# Patient Record
Sex: FEMALE | Race: BLACK OR AFRICAN AMERICAN | NOT HISPANIC OR LATINO | Employment: UNEMPLOYED | URBAN - METROPOLITAN AREA
[De-identification: names, ages, dates, MRNs, and addresses within clinical notes are randomized per-mention and may not be internally consistent; named-entity substitution may affect disease eponyms.]

---

## 2021-11-17 ENCOUNTER — HISTORICAL (OUTPATIENT)
Dept: ADMINISTRATIVE | Facility: HOSPITAL | Age: 56
End: 2021-11-17

## 2021-11-19 LAB — FINAL CULTURE: NO GROWTH

## 2021-12-07 ENCOUNTER — HISTORICAL (OUTPATIENT)
Dept: ADMINISTRATIVE | Facility: HOSPITAL | Age: 56
End: 2021-12-07

## 2021-12-09 LAB — FINAL CULTURE: NORMAL

## 2022-07-18 DIAGNOSIS — E04.1 THYROID NODULE: Primary | ICD-10-CM

## 2022-07-25 ENCOUNTER — HISTORICAL (OUTPATIENT)
Dept: ADMINISTRATIVE | Facility: HOSPITAL | Age: 57
End: 2022-07-25

## 2023-06-07 ENCOUNTER — HOSPITAL ENCOUNTER (OUTPATIENT)
Dept: RADIOLOGY | Facility: HOSPITAL | Age: 58
Discharge: HOME | End: 2023-06-07
Attending: OTOLARYNGOLOGY
Payer: MEDICAID

## 2023-06-07 DIAGNOSIS — E04.1 NONTOXIC UNINODULAR GOITER: ICD-10-CM

## 2023-06-07 PROCEDURE — 76536 US EXAM OF HEAD AND NECK: CPT | Mod: TC

## 2023-07-19 DIAGNOSIS — E04.2 MULTIPLE THYROID NODULES: Primary | ICD-10-CM

## 2023-08-21 ENCOUNTER — OFFICE VISIT (OUTPATIENT)
Dept: OTOLARYNGOLOGY | Facility: CLINIC | Age: 58
End: 2023-08-21
Payer: MEDICAID

## 2023-08-21 VITALS
HEIGHT: 65 IN | TEMPERATURE: 98 F | DIASTOLIC BLOOD PRESSURE: 86 MMHG | BODY MASS INDEX: 38.02 KG/M2 | WEIGHT: 228.19 LBS | SYSTOLIC BLOOD PRESSURE: 152 MMHG | HEART RATE: 66 BPM

## 2023-08-21 DIAGNOSIS — K21.9 GASTROESOPHAGEAL REFLUX DISEASE, UNSPECIFIED WHETHER ESOPHAGITIS PRESENT: ICD-10-CM

## 2023-08-21 DIAGNOSIS — R13.10 DYSPHAGIA, UNSPECIFIED TYPE: ICD-10-CM

## 2023-08-21 DIAGNOSIS — E04.2 MULTIPLE THYROID NODULES: Primary | ICD-10-CM

## 2023-08-21 DIAGNOSIS — R49.0 DYSPHONIA: ICD-10-CM

## 2023-08-21 DIAGNOSIS — J30.9 ALLERGIC RHINITIS, UNSPECIFIED SEASONALITY, UNSPECIFIED TRIGGER: ICD-10-CM

## 2023-08-21 DIAGNOSIS — T17.308A CHOKING, INITIAL ENCOUNTER: ICD-10-CM

## 2023-08-21 DIAGNOSIS — G47.30 SLEEP DISORDER BREATHING: ICD-10-CM

## 2023-08-21 PROCEDURE — 31575 PR LARYNGOSCOPY, FLEXIBLE; DIAGNOSTIC: ICD-10-PCS | Mod: S$PBB,,, | Performed by: NURSE PRACTITIONER

## 2023-08-21 PROCEDURE — 3008F BODY MASS INDEX DOCD: CPT | Mod: CPTII,,, | Performed by: NURSE PRACTITIONER

## 2023-08-21 PROCEDURE — 3077F SYST BP >= 140 MM HG: CPT | Mod: CPTII,,, | Performed by: NURSE PRACTITIONER

## 2023-08-21 PROCEDURE — 3079F PR MOST RECENT DIASTOLIC BLOOD PRESSURE 80-89 MM HG: ICD-10-PCS | Mod: CPTII,,, | Performed by: NURSE PRACTITIONER

## 2023-08-21 PROCEDURE — 3008F PR BODY MASS INDEX (BMI) DOCUMENTED: ICD-10-PCS | Mod: CPTII,,, | Performed by: NURSE PRACTITIONER

## 2023-08-21 PROCEDURE — 3077F PR MOST RECENT SYSTOLIC BLOOD PRESSURE >= 140 MM HG: ICD-10-PCS | Mod: CPTII,,, | Performed by: NURSE PRACTITIONER

## 2023-08-21 PROCEDURE — 31575 DIAGNOSTIC LARYNGOSCOPY: CPT | Mod: S$PBB,,, | Performed by: NURSE PRACTITIONER

## 2023-08-21 PROCEDURE — 99215 OFFICE O/P EST HI 40 MIN: CPT | Mod: PBBFAC | Performed by: NURSE PRACTITIONER

## 2023-08-21 PROCEDURE — 3079F DIAST BP 80-89 MM HG: CPT | Mod: CPTII,,, | Performed by: NURSE PRACTITIONER

## 2023-08-21 PROCEDURE — 1159F MED LIST DOCD IN RCRD: CPT | Mod: CPTII,,, | Performed by: NURSE PRACTITIONER

## 2023-08-21 PROCEDURE — 1159F PR MEDICATION LIST DOCUMENTED IN MEDICAL RECORD: ICD-10-PCS | Mod: CPTII,,, | Performed by: NURSE PRACTITIONER

## 2023-08-21 PROCEDURE — 99205 PR OFFICE/OUTPT VISIT, NEW, LEVL V, 60-74 MIN: ICD-10-PCS | Mod: 25,S$PBB,, | Performed by: NURSE PRACTITIONER

## 2023-08-21 PROCEDURE — 99205 OFFICE O/P NEW HI 60 MIN: CPT | Mod: 25,S$PBB,, | Performed by: NURSE PRACTITIONER

## 2023-08-21 PROCEDURE — 31575 DIAGNOSTIC LARYNGOSCOPY: CPT | Mod: PBBFAC | Performed by: NURSE PRACTITIONER

## 2023-08-21 RX ORDER — CYCLOBENZAPRINE HCL 10 MG
TABLET ORAL
COMMUNITY

## 2023-08-21 RX ORDER — LIDOCAINE HYDROCHLORIDE 40 MG/ML
1 INJECTION, SOLUTION RETROBULBAR ONCE
Status: DISPENSED | OUTPATIENT
Start: 2023-08-21

## 2023-08-21 RX ORDER — PANTOPRAZOLE SODIUM 40 MG/1
40 TABLET, DELAYED RELEASE ORAL DAILY
Qty: 30 TABLET | Refills: 2 | Status: SHIPPED | OUTPATIENT
Start: 2023-08-21 | End: 2023-08-21 | Stop reason: SDUPTHER

## 2023-08-21 RX ORDER — ASPIRIN 325 MG
TABLET, DELAYED RELEASE (ENTERIC COATED) ORAL
COMMUNITY

## 2023-08-21 RX ORDER — PANTOPRAZOLE SODIUM 40 MG/1
40 TABLET, DELAYED RELEASE ORAL 2 TIMES DAILY
Qty: 30 TABLET | Refills: 1 | Status: SHIPPED | OUTPATIENT
Start: 2023-08-21 | End: 2023-11-21

## 2023-08-21 RX ORDER — IBUPROFEN 800 MG/1
TABLET ORAL
COMMUNITY

## 2023-08-21 RX ORDER — DICLOFENAC SODIUM 75 MG/1
TABLET, DELAYED RELEASE ORAL
COMMUNITY

## 2023-08-21 RX ORDER — NAPROXEN 500 MG/1
500 TABLET ORAL 2 TIMES DAILY
COMMUNITY
Start: 2023-08-01

## 2023-08-21 RX ORDER — HYDROCODONE BITARTRATE AND ACETAMINOPHEN 10; 325 MG/1; MG/1
TABLET ORAL
COMMUNITY

## 2023-08-21 RX ORDER — FLUTICASONE PROPIONATE 50 MCG
1 SPRAY, SUSPENSION (ML) NASAL DAILY
Qty: 16 G | Refills: 11 | Status: SHIPPED | OUTPATIENT
Start: 2023-08-21

## 2023-08-21 RX ORDER — METAXALONE 800 MG/1
800 TABLET ORAL 3 TIMES DAILY
COMMUNITY
Start: 2023-08-17

## 2023-08-21 RX ORDER — ROSUVASTATIN CALCIUM 5 MG/1
5 TABLET, COATED ORAL
COMMUNITY
Start: 2023-08-07

## 2023-08-21 RX ORDER — MECLIZINE HCL 12.5 MG 12.5 MG/1
TABLET ORAL
COMMUNITY

## 2023-08-21 NOTE — PROGRESS NOTES
"[facility]  Otolaryngology Clinic Note    Pattie Stewart  YOB: 1965    Chief Complaint: thyroid    HPI: 2023: 57yoF referred for thyroid nodule. States she had it drained by Dr. Upton about 2 years ago and has had 2 benign biopsies in the past 2-3 years. Endorses frequent dysphagia and choking as well as dysphonia which began about 2 years ago. Denies compressive SOB but states she does not lie supine because of prior back surgery. States PCP lan blood work recently and told her thyroid labs were normal. Endorses frequent reflux/indigestion which is untreated. She does not have family hx of thyroid cancer, but does have one sister who had thyroidectomy and another sister who took CATALAN for overactive thyroid. Denies radiation exposure. C/o "sinus pressure" which flares with seasonal changes and improves with OTC meds. She has not used any rinses or sprays. States she usually only sleeps a couple hours a night. She is unsure if she snores. She has 2 siblings with NORI.    ROS:   10-point review of systems negative except per HPI      Review of patient's allergies indicates:  No Known Allergies    Past Medical History:   Diagnosis Date    Hypertension     Hypothyroidism     Migraine headache     Obesity     Rheumatoid arthritis     Seasonal allergies     Sleep apnea        Past Surgical History:   Procedure Laterality Date    BLADDER SURGERY       SECTION  1991    HYSTERECTOMY      SPINE SURGERY         Social History     Socioeconomic History    Marital status:    Tobacco Use    Smoking status: Never     Passive exposure: Never    Smokeless tobacco: Never   Substance and Sexual Activity    Alcohol use: Yes     Comment: Social occasions    Drug use: Never    Sexual activity: Not Currently     Partners: Male     Birth control/protection: See Surgical Hx       Family History   Problem Relation Age of Onset    Cancer Father     Thyroid disease Sister  " "      Outpatient Encounter Medications as of 8/21/2023   Medication Sig Dispense Refill    cholecalciferol, vitamin D3, 1,250 mcg (50,000 unit) capsule cholecalciferol (vitamin D3) 1,250 mcg (50,000 unit) capsule   TAKE 1 CAPSULE BY MOUTH ONCE A WEEK FOR 12 WEEKS      cyclobenzaprine (FLEXERIL) 10 MG tablet cyclobenzaprine 10 mg tablet   TAKE 1 TABLET BY MOUTH THREE TIMES DAILY AS NEEDED      diclofenac (VOLTAREN) 75 MG EC tablet diclofenac sodium 75 mg tablet,delayed release   TAKE 1 TABLET BY MOUTH TWICE DAILY      HYDROcodone-acetaminophen (NORCO)  mg per tablet as needed.      ibuprofen (ADVIL,MOTRIN) 800 MG tablet as needed.      meclizine (ANTIVERT) 12.5 mg tablet meclizine 12.5 mg tablet   TAKE 1 TABLET BY MOUTH THREE TIMES DAILY AS NEEDED      metaxalone (SKELAXIN) 800 MG tablet Take 800 mg by mouth 3 (three) times daily.      naproxen (NAPROSYN) 500 MG tablet Take 500 mg by mouth 2 (two) times daily.      rosuvastatin (CRESTOR) 5 MG tablet Take 5 mg by mouth.       No facility-administered encounter medications on file as of 8/21/2023.       Physical Exam:  Vitals:    08/21/23 0941 08/21/23 0943   BP: (!) 151/89 (!) 152/86   BP Location: Right arm Right arm   Patient Position: Sitting Sitting   BP Method: Large (Automatic) Large (Manual)   Pulse: 66    Temp: 98.4 °F (36.9 °C)    TempSrc: Oral    Weight: 103.5 kg (228 lb 3.2 oz)    Height: 5' 5" (1.651 m)        General: NAD, voice raspy  Neuro: AAO, CN II - XII grossly intact  Head/ Face: NCAT, symmetric, sensations intact bilaterally  Eyes: EOMI, PERRL  Ears: externally normal with grossly normal hearing  AD: EAC patent, TM intact, no middle ear effusion, no retractions  AS: EAC patent, TM intact, no middle ear effusion, no retractions  Nose: bilateral nares patent, midline septum, no rhinorrhea, no external deformity, no turbinate hypertrophy  OC/OP: MMM, no intraoral lesions, no trismus, dentition is moderate, no uvular deviation, bilaterally " symmetric soft palate elevation, palatoglossus and palatopharyngeal fold wnl; tonsils are symmetric and 1+. Mallampati IV  Indirect laryngoscopy: deferred due to patient intolerance  Neck: soft, supple, no LAD, normal ROM, no palpable thyromegaly or nodularity  Respiratory: nonlabored, no wheezing, bilateral chest rise  Cardiovascular: RRR  Gastrointestinal: S NT ND  Skin: warm, no lesions  Musculoskeletal: 5/5 strength  Psych: Appropriate affect/mood     Flexible Fiberoptic Laryngoscopy/Nasopharyngoscopy with Dr. Don    Procedure in Detail: Informed consent was obtained from the patient after explanation of procedure, indications, risks and benefits. Flexible endoscopy was performed through the nasal passages. The nasal cavity, nasopharynx, oropharynx, hypopharynx and larynx were adequately visualized. The true vocal cords and arytenoids were examined during phonation and repose.    Anesthesia: Topical Neosynephrine / Lidocaine  Adverse Events: None  Blood loss: none  Condition: good    Findings:  NP/OP: no masses/lesions of NC, eustachian tube, fossa of Rosenmuller, no adenoid hypertrophy  BOT/vallecula: no lingual hypertrophy, no masses/lesions, no secretions obscuring visualization  Piriform sinuses/post-cricoid: no masses/lesions, no pooling of secretions  Epiglottis: lingual and laryngeal surfaces within normal limits  Arytenoids/FVFs: no masses/lesions, no edema, bilateral mobility. Mild interarytenoid erythema  TVCs: bilateral cord mobility, no masses or lesions.   No aspiration, no pooled secretions  No sign of malignancy      Pertinent Data:  ? LABS:  ? AUDIO:           ? PATH:      Imaging:           Assessment/Plan:  57 y.o. female with 3.9cm right thyroid nodule, reports FNA x2 with benign path and normal TFT's. Also with untreated GERD, AR, dysphagia. She is interested in discussing surgical thyroid interventions, however, will tx GERD & eval swallow first. D/w Dr. Don. Will request path for  previous FNA.   - NSI prn  - Flonase daily  - Protonix BID  - GERD lifestyle modifications  - MBS + esophagram  - PSG  - RTC 2-3mo Res template or Res day    Nallely Hairston NP

## 2023-08-21 NOTE — PROGRESS NOTES
The scope used for the exam was:  Flexible scope ENF-P4  Serial Number:  1)    [id number]    []   2)    [id number]    []   3)    [id number]    []   4)    [id number]    [x]   5)    [id number]    []   6)    [id number]    []       The scope used for the exam was:  Rigid scope   Serial Number:  1)   [id number]    []   2)   6282    []   3)   7330    []   4)    3384   []   5)    0824   []   6)    5554   []     7)   7425    []   8)   2240    []   9)   1109    []

## 2023-08-28 ENCOUNTER — HOSPITAL ENCOUNTER (OUTPATIENT)
Dept: RADIOLOGY | Facility: HOSPITAL | Age: 58
Discharge: HOME | End: 2023-08-28
Attending: NURSE PRACTITIONER
Payer: MEDICAID

## 2023-08-28 DIAGNOSIS — T17.308A CHOKING, INITIAL ENCOUNTER: ICD-10-CM

## 2023-08-28 DIAGNOSIS — R13.10 DYSPHAGIA, UNSPECIFIED TYPE: ICD-10-CM

## 2023-08-28 DIAGNOSIS — K21.9 GASTROESOPHAGEAL REFLUX DISEASE, UNSPECIFIED WHETHER ESOPHAGITIS PRESENT: ICD-10-CM

## 2023-08-28 PROCEDURE — 74220 X-RAY XM ESOPHAGUS 1CNTRST: CPT | Mod: TC

## 2023-11-08 ENCOUNTER — CLINICAL SUPPORT (OUTPATIENT)
Dept: REHABILITATION | Facility: HOSPITAL | Age: 58
End: 2023-11-08
Attending: NURSE PRACTITIONER
Payer: MEDICAID

## 2023-11-08 ENCOUNTER — HOSPITAL ENCOUNTER (OUTPATIENT)
Dept: RADIOLOGY | Facility: HOSPITAL | Age: 58
Discharge: HOME | End: 2023-11-08
Attending: NURSE PRACTITIONER
Payer: MEDICAID

## 2023-11-08 DIAGNOSIS — R13.10 DYSPHAGIA, UNSPECIFIED TYPE: ICD-10-CM

## 2023-11-08 DIAGNOSIS — T17.308A CHOKING, INITIAL ENCOUNTER: ICD-10-CM

## 2023-11-08 DIAGNOSIS — K21.9 GASTROESOPHAGEAL REFLUX DISEASE, UNSPECIFIED WHETHER ESOPHAGITIS PRESENT: ICD-10-CM

## 2023-11-08 PROCEDURE — 74230 X-RAY XM SWLNG FUNCJ C+: CPT | Mod: TC

## 2023-11-08 PROCEDURE — 92611 MOTION FLUOROSCOPY/SWALLOW: CPT

## 2023-11-09 NOTE — PLAN OF CARE
[facility]  MODIFIED BARIUM SWALLOW STUDY  Outpatient    Date: 23      Name: Pattie Stewart   MRN: [id number]    Therapy Diagnosis: swallow WFL    Physician: Nallely Hairston FNP  Physician Orders: SLP Video Swallow  Medical Diagnosis from Referral:       Time In:  0800  Time Out:  0830  Total Billable Time: 30     Procedure Min.   Fl Modified Barium Swallow Speech  30     Precautions: Standard and Aspiration    Recommendations:   Consistency Recommendations:  Thin liquids (IDDSI 0) and Regular consistencies (IDDSI 7).  Medications should be taken Whole in thin liquids.   Precautions:frequent oral care  Risk Management: use good oral hygiene , sit upright for all PO intake, and increase physical mobility as tolerated  Specialist Referrals:   Ancillary Tests:   Therapy: Dysphagia therapy is not recommended at this time.  Frequency:   Follow-up exam: Follow up swallow study is not indicated at this time.    Subjective       Past Medical History: Pattie Stewart  has a past surgical history that includes  section (1991); Spine surgery; Hysterectomy; and Bladder surgery.     Active Ambulatory Problems     Diagnosis Date Noted    No Active Ambulatory Problems     Resolved Ambulatory Problems     Diagnosis Date Noted    No Resolved Ambulatory Problems     Past Medical History:   Diagnosis Date    Hypertension     Hypothyroidism     Migraine headache     Obesity     Rheumatoid arthritis     Seasonal allergies     Sleep apnea       Medical Hx and Allergies:  Review of patient's allergies indicates:  No Known Allergies    Modified barium swallow study (MBSS) completed to assess swallow effectiveness, ID/rule out penetration and aspiration, make appropriate recommendations regarding safest diet consistency, effective compensatory strategies and safe eating environment.       The patient gave the following history:   -Current diet at home: regular/thin      The following  observations were made:   -Mental status: Alert and Cooperative  -Factors affecting performance: no difficulties participating in the study  -Feeding Method: independent in self-feeding    Respiratory Status:   -Respiratory Status: room air      Pain Scale:  0/10 on VAS currently.   Pain Location: no pain reported    Objective   Cranial Nerve Examination  Cranial Nerve 5: Trigeminal Nerve  Motor Jaw Posture at rest: Closed  Mandible Elevation/Depression: WFL  Mandible lateralization: WFL  Abnormal movement: absent Interpretation:   intact   Sensory Forehead: WFL  Cheek: WFL  Jaw: WFL  Facial Pain: None noted Interpretation:   intact     Cranial Nerve 7: Facial Nerve  Motor Facial Symmetry: WNL  Wrinkle Forehead: WFL  Close eyes tightly: WFL  Labial Protrusion: WFL  Labial Retraction: WFL  Abnormal movement: absent Interpretation:   intact   Sensory Formal testing not completed. Patient denied any changes in taste      Cranial Nerves IX and X: Glossopharyngeal and Vagus Nerves  Motor Palatal Symmetry (Rest): WNL  Palatal Symmetry (Movement): WNL  Cough: Perceptually strong  Voice Prior to PO intake: Clear  Resonance: Normal  Abnormal movement: absent Interpretation:   intact     Cranial Nerve XII: Hypoglossal Nerve  Motor Tongue at rest: WNL  Lingual Protrusion: WNL  Lingual Protrusion against Resistance: WNL  Lingual Lateralization: WNL  Abnormal movement: absent Interpretation:   intact     Other information:  Volitional Swallow: Able to palpate laryngeal rise  Mucosal Quality: No abnormal findings  Secretion Management: Secretion Mgmt: adequate  Dentition: Good condition for speech and mastication     CONSISTENCIES ADMINISTERED:  Thin Liquids (IDDSI 0):  Mode: Independent   Oral Residue: none    Vallecular Residue: none    Pyriform Sinus Residue: none    Rosenbeck's 8-Point Penetration-Aspiration Scale:  Best: (1) Material does not enter the airway  penetration or aspiration did not occur during the swallow with  thin liquids via straw  Strategies attempted:   Barium Tablet: The barium tablet was administered in thin liquids due to  .    Mildly Thick Liquids (IDDSI 2):  Puree (IDDSI 4):  Mode: Independent  Oral Residue: none   Vallecular Residue: none   Pyriform Sinus Residue none  Rosenbeck's 8-Point Penetration-Aspiration Scale:   Best: none  penetration or aspiration did not occur during the swallow.    Strategies attempted:   Barium Tablet:     Mixed (Soft and Bite Sized) Consistency Bolus (IDDSI 6 with IDDSI 2):  DNT    Regular (IDDSI 7):  Mode: Independent  Oral Residue: none   Vallecular Residue: none   Pyriform Sinus Residue: none   Rosenbeck's 8-Point Penetration-Aspiration Scale:   Best: (1) Material does not enter the airway  penetration or aspiration did not occur during the swallow.    Strategies attempted:     Impression   Patient's oropharyngeal swallow appear within functional limits for the  consistencies assessed. No clinical s/sx of airway invasion appreciated during this assessment. Based on this assessment, patient appears safe for initiation of a PO diet with general swallowing precautions. SLP to follow up as indicated.     Assessment     Preparatory Phase:     Patient Positioning:   Upright in radiology chair      Level of Assistance:   Independent      Regulation of Bolus:   Independent    Oral Phase:     Labial Seal:   no labial escape      Lingual Movement:   Lingual motion was brisk for adequate bolus transport.    Bolus Acceptance:   WFL     Oral Manipulation:   WFL     Mastication:   timely and efficient    Oral Transit:   WFL     Oral Pocketing:   No    Oral Residue:   There was no significant oral residue.    Pharyngeal Phase:     Tongue Base Retraction:   No contrast between the base of tongue and posterior pharyngeal wall with any consistency      Initiation of Pharyngeal Swallow:   Occurs when the bolus head is at the ramus of the mandible with most consistency      Epiglottic  Closure:  Complete inversion      Laryngeal Elevation:   Complete elevation      Anterior Hyoid Excursion:   Complete excursion      Pharyngeal Contractions (A-P view only):   DNT     Pharyngeal Stripping Wave:   Present       Vallecular Residue:  None observed with any consistencies      Posterior Pharyngeal Wall Residue:   None observed with any consistencies      Pyriform Sinus Residue:   None observed with any consistencies       Esophageal Phase:   On esophageal screen, UES appeared to accommodate all bolus types without stasis or retrograde movement observed          Goals:           The Functional Oral Intake Scale (FOIS) is an ordinal scale that is used to assess the current status and meaningful change in the oral intake. FOIS levels include:        TUBE DEPENDENT   (Levels 1-3) 1. No oral intake    2. Tube dependent with minimal/inconsistent oral intake    3. Tube supplements with consistent oral intake          TOTAL ORAL INTAKE (Levels 4-7) 4. Total oral intake of a single consistency    5. Total oral intake of multiple consistencies requiring special preparation    6. Total oral intake with no special preparation, but must avoid specific foods or liquid items    7. Total oral intake with no restrictions   Patient is currently judged to be at FOIS Level 7      *MARVIN De La Rosa (2005, March). Pneumonia: Factors Beyond Aspiration. Perspectives in Swallowing and Swallowing Disorders (Dysphagia), 14, 10-16.  Education   Education: Results of the study and Recommendations were discussed with the patient. Patient expressed understanding.     Barriers to Learning: n/a    Teaching Method: Verbal, Audio/Visual          Jasen Fuentes M.S. CCC-SLP  [facility]

## 2023-11-21 ENCOUNTER — OFFICE VISIT (OUTPATIENT)
Dept: OTOLARYNGOLOGY | Facility: CLINIC | Age: 58
End: 2023-11-21
Payer: MEDICAID

## 2023-11-21 VITALS
OXYGEN SATURATION: 98 % | WEIGHT: 222.63 LBS | HEART RATE: 59 BPM | TEMPERATURE: 98 F | HEIGHT: 65 IN | SYSTOLIC BLOOD PRESSURE: 136 MMHG | BODY MASS INDEX: 37.09 KG/M2 | DIASTOLIC BLOOD PRESSURE: 76 MMHG

## 2023-11-21 DIAGNOSIS — E04.2 MULTIPLE THYROID NODULES: Primary | ICD-10-CM

## 2023-11-21 DIAGNOSIS — R13.10 DYSPHAGIA, UNSPECIFIED TYPE: ICD-10-CM

## 2023-11-21 PROCEDURE — 99214 OFFICE O/P EST MOD 30 MIN: CPT | Mod: PBBFAC | Performed by: STUDENT IN AN ORGANIZED HEALTH CARE EDUCATION/TRAINING PROGRAM

## 2023-11-21 RX ORDER — PANTOPRAZOLE SODIUM 40 MG/1
40 TABLET, DELAYED RELEASE ORAL DAILY
Qty: 90 TABLET | Refills: 1 | Status: SHIPPED | OUTPATIENT
Start: 2023-11-21 | End: 2024-05-19

## 2023-11-21 NOTE — PROGRESS NOTES
"[facility]  Otolaryngology Clinic Note    Pattie Stewart  YOB: 1965    Chief Complaint: thyroid    HPI: 8/2023: 57yoF referred for thyroid nodule. States she had it drained by Dr. Upton about 2 years ago and has had 2 benign biopsies in the past 2-3 years. Endorses frequent dysphagia and choking as well as dysphonia which began about 2 years ago. Denies compressive SOB but states she does not lie supine because of prior back surgery. States PCP lan blood work recently and told her thyroid labs were normal. Endorses frequent reflux/indigestion which is untreated. She does not have family hx of thyroid cancer, but does have one sister who had thyroidectomy and another sister who took CATALAN for overactive thyroid. Denies radiation exposure. C/o "sinus pressure" which flares with seasonal changes and improves with OTC meds. She has not used any rinses or sprays. States she usually only sleeps a couple hours a night. She is unsure if she snores. She has 2 siblings with NORI.    11/21/23:  Here today to follow-up thyroid nodules as well as recent swallow study. She reports having prior biopsies in 2019 and 2022 of her thyroid nodule which were both reportedly benign but we have not been able to obtain the records. She recently had a normal MBSS and pharyngogram. She reports not having swallowing issues lately. She took the protonix for a perior of time but does not think she currently has it filled and is not currently taking it. She has not yet had the sleep study ordered at her prior visit. She notes episodes that occur 1-2 times per year where she has noisy and difficulty breathing that lasts for seconds and then resolves.     ROS:   10-point review of systems negative except per HPI      Review of patient's allergies indicates:  No Known Allergies    Past Medical History:   Diagnosis Date    Hypertension     Hypothyroidism     Migraine headache     Obesity     Rheumatoid arthritis "     Seasonal allergies     Sleep apnea        Past Surgical History:   Procedure Laterality Date    BLADDER SURGERY       SECTION  1991    HYSTERECTOMY      SPINE SURGERY         Social History     Socioeconomic History    Marital status:    Tobacco Use    Smoking status: Never     Passive exposure: Never    Smokeless tobacco: Never   Substance and Sexual Activity    Alcohol use: Yes     Comment: Social occasions    Drug use: Never    Sexual activity: Not Currently     Partners: Male     Birth control/protection: See Surgical Hx       Family History   Problem Relation Age of Onset    Cancer Father     Thyroid disease Sister        Outpatient Encounter Medications as of 2023   Medication Sig Dispense Refill    cholecalciferol, vitamin D3, 1,250 mcg (50,000 unit) capsule cholecalciferol (vitamin D3) 1,250 mcg (50,000 unit) capsule   TAKE 1 CAPSULE BY MOUTH ONCE A WEEK FOR 12 WEEKS      cyclobenzaprine (FLEXERIL) 10 MG tablet cyclobenzaprine 10 mg tablet   TAKE 1 TABLET BY MOUTH THREE TIMES DAILY AS NEEDED      diclofenac (VOLTAREN) 75 MG EC tablet diclofenac sodium 75 mg tablet,delayed release   TAKE 1 TABLET BY MOUTH TWICE DAILY      fluticasone propionate (FLONASE) 50 mcg/actuation nasal spray 1 spray (50 mcg total) by Each Nostril route once daily. 16 g 11    HYDROcodone-acetaminophen (NORCO)  mg per tablet as needed.      ibuprofen (ADVIL,MOTRIN) 800 MG tablet as needed.      meclizine (ANTIVERT) 12.5 mg tablet meclizine 12.5 mg tablet   TAKE 1 TABLET BY MOUTH THREE TIMES DAILY AS NEEDED      metaxalone (SKELAXIN) 800 MG tablet Take 800 mg by mouth 3 (three) times daily.      naproxen (NAPROSYN) 500 MG tablet Take 500 mg by mouth 2 (two) times daily.      rosuvastatin (CRESTOR) 5 MG tablet Take 5 mg by mouth.      pantoprazole (PROTONIX) 40 MG tablet Take 1 tablet (40 mg total) by mouth 2 (two) times daily. (Patient not taking: Reported on 2023) 30 tablet 1  "    Facility-Administered Encounter Medications as of 11/21/2023   Medication Dose Route Frequency Provider Last Rate Last Admin    LIDOcaine (PF) 40 mg/mL (4 %) injection 1 mL  1 mL Other Once Nallely Hairston FNP        phenylephrine HCL 1% nasal spray 1 spray  1 spray Each Nostril 1 time in Clinic/HOD Nallely Hairston FNP           Physical Exam:  Vitals:    11/21/23 1031   BP: 136/76   BP Location: Left arm   Patient Position: Sitting   BP Method: Small (Automatic)   Pulse: (!) 59   Temp: 97.7 °F (36.5 °C)   TempSrc: Oral   SpO2: 98%   Weight: 101 kg (222 lb 9.6 oz)   Height: 5' 5" (1.651 m)       General: NAD, voice raspy  Neuro: AAO, CN II - XII grossly intact  Head/ Face: NCAT, symmetric, sensations intact bilaterally  Eyes: EOMI, PERRL  Ears: externally normal with grossly normal hearing  AD: EAC patent, TM intact, no middle ear effusion, no retractions  AS: EAC patent, TM intact, no middle ear effusion, no retractions  Nose: bilateral nares patent, midline septum, no rhinorrhea, no external deformity, no turbinate hypertrophy  OC/OP: MMM, no intraoral lesions, no trismus, dentition is moderate, no uvular deviation, bilaterally symmetric soft palate elevation, palatoglossus and palatopharyngeal fold wnl; tonsils are symmetric and 1+  Indirect laryngoscopy: deferred due to patient intolerance  Neck: soft, supple, no LAD, normal ROM, palpable fullness of right thyroid lobe  Respiratory: nonlabored, no wheezing, bilateral chest rise  Cardiovascular: RRR  Gastrointestinal: S NT ND  Skin: warm, no lesions  Musculoskeletal: 5/5 strength  Psych: Appropriate affect/mood     Flexible Fiberoptic Laryngoscopy/Nasopharyngoscopy with Dr. Don 8/2023    Procedure in Detail: Informed consent was obtained from the patient after explanation of procedure, indications, risks and benefits. Flexible endoscopy was performed through the nasal passages. The nasal cavity, nasopharynx, oropharynx, hypopharynx and larynx were adequately " visualized. The true vocal cords and arytenoids were examined during phonation and repose.    Anesthesia: Topical Neosynephrine / Lidocaine  Adverse Events: None  Blood loss: none  Condition: good    Findings:  NP/OP: no masses/lesions of NC, eustachian tube, fossa of Rosenmuller, no adenoid hypertrophy  BOT/vallecula: no lingual hypertrophy, no masses/lesions, no secretions obscuring visualization  Piriform sinuses/post-cricoid: no masses/lesions, no pooling of secretions  Epiglottis: lingual and laryngeal surfaces within normal limits  Arytenoids/FVFs: no masses/lesions, no edema, bilateral mobility. Mild interarytenoid erythema  TVCs: bilateral cord mobility, no masses or lesions.   No aspiration, no pooled secretions  No sign of malignancy      Pertinent Data:  ? LABS:  ? AUDIO:           ? PATH:      Imaging:   EXAMINATION:  STUDY: US THYROID     CLINICAL HISTORY AND TECHNIQUE:  Wilber Mckay, RT on 6/7/2023 10:22 AM     CLINICAL HX: -OP- SOME DYSPHAGIA WITH FULLNESS FELT TO RT SIDE OF NECK X FEW MONTHS     PMH: HX OF THYROID NODULES AND FNA'S     TECHNIQUE: 2D THYROID ULTRASOUND WITH COLOR DOPPLER     TECHNOLOGIST: [facility]     COMPARISON:  None     FINDINGS:  RIGHT LOBE:     Size:5.9 cm in length     Echogenic texture:Homogeneous and unremarkable     Nodules:A 3 cm, mixed echogenic (predominately hypoechoic), elliptical nodule with mildly increased perfusion on color-flow mapping is noted within the mid and lower pole     Perfusion:Unremarkable     ISTHMUS:     Echogenic texture:Homogeneous and unremarkable     Nodules:No worrisome nodules noted     Perfusion: Not evaluated     LEFT LOBE:     Size:5.8 cm in length     Echogenic texture:Homogeneous and unremarkable     Nodules:Multiple (at least 5), small (1.3 cm or less) hypoechoic nodules with no worrisome perfusion on color-flow mapping are scattered throughout the left lobe of the thyroid gland     Perfusion:Unremarkable     Impression:     1. The thyroid gland  is moderately enlarged.  2. Scattered nodules are noted throughout the left lobe of the thyroid gland as described above and I suspect represent benign adenomas in various stages of involution.  See above comments for details.  3. A 3 cm, mixed echogenic nodule with mildly increased perfusion on color-flow mapping is noted within the mid to lower pole of the right lobe of the thyroid gland.  This may simply represent a benign adenoma, however, further workup with ultrasound guided FNA is recommended to exclude a neoplastic process.        Assessment/Plan:  58 y.o. female with 3.9cm right thyroid nodule, reports FNA x2 with benign path and normal TFT's. Also with untreated GERD, AR, dysphagia. Recent swallow study within normal limits but she does describe rare episodes with difficulty and noisy breathing, possible paradoxical vocal fold motion? She has not had an episode in 6 months.  - We discussing sniffing through nose and exhaling through pursed lips exercise to practice daily and use when an episode occurs to see if that helps.   - Restart protonix.   - Sleep study previously ordered and hasn't yet been scheduled.   - We discussed options of US surveillance for her nodules vs repeat biopsy vs hemithyroidectomy due to biopsy sampling error. The nodule was previously 4cm in 2022, 3 cm in 6/2023. At this time she would like to proceed with US surveillance with possibly considering a repeat biopsy in the future.   - Refer to social work for assistance with obtaining insurance. She reports that she is being kicked off her current plan starting in December.   - Repeat US ordered for 6 months from now. RTC afterward.     Annita Fink, PGY-5  [facility]            no